# Patient Record
(demographics unavailable — no encounter records)

---

## 2020-11-06 NOTE — [facility]GEPIP
[facility] - ED

                                       

                                       Test Date:    2020

Pat Name:     ODILIA SANTIAGO            Department:   

Patient ID:   [id number]                 Room:         -

Gender:       Female                   Technician:   ETIENNE

:          1994               Requested By: GARY WILSON PA-C

Order Number: [id number]     Reading MD:   Radha Balbuena

                                 Measurements

Intervals                              Axis          

Rate:         68                       P:            54

MI:           137                      QRS:          20

QRSD:         102                      T:            30

QT:           400                                    

QTc:          428                                    

                           Interpretive Statements

SINUS RHYTHM WITH SINUS ARRHYTHMIA

No prior

Electronically Signed on 2020 13:37:24 EST by Radha Balbuena